# Patient Record
Sex: FEMALE | Race: WHITE | NOT HISPANIC OR LATINO | URBAN - METROPOLITAN AREA
[De-identification: names, ages, dates, MRNs, and addresses within clinical notes are randomized per-mention and may not be internally consistent; named-entity substitution may affect disease eponyms.]

---

## 2017-04-18 ENCOUNTER — IMPORTED ENCOUNTER (OUTPATIENT)
Dept: URBAN - METROPOLITAN AREA CLINIC 50 | Facility: CLINIC | Age: 77
End: 2017-04-18

## 2017-04-26 ENCOUNTER — IMPORTED ENCOUNTER (OUTPATIENT)
Dept: URBAN - METROPOLITAN AREA CLINIC 50 | Facility: CLINIC | Age: 77
End: 2017-04-26

## 2017-04-26 NOTE — PATIENT DISCUSSION
"""REC LIPIFLOW"" ""A. T.(Artificial Tears) BID - QID(2-4 times a day)Gel QHS(at bedtime)W. C.(warm compresses) FSO po BID (Flaxseed Oil

## 2018-05-11 ENCOUNTER — IMPORTED ENCOUNTER (OUTPATIENT)
Dept: URBAN - METROPOLITAN AREA CLINIC 50 | Facility: CLINIC | Age: 78
End: 2018-05-11

## 2018-11-09 ENCOUNTER — IMPORTED ENCOUNTER (OUTPATIENT)
Dept: URBAN - METROPOLITAN AREA CLINIC 50 | Facility: CLINIC | Age: 78
End: 2018-11-09

## 2019-05-03 ENCOUNTER — IMPORTED ENCOUNTER (OUTPATIENT)
Dept: URBAN - METROPOLITAN AREA CLINIC 50 | Facility: CLINIC | Age: 79
End: 2019-05-03

## 2019-05-03 NOTE — PATIENT DISCUSSION
"""Continue Artificial tears both eyes two - four times a day ."" ""Continue Gel drops both eyes at bedtime . "" ""Continue Warm compresses both eyes twice a day

## 2019-05-24 ENCOUNTER — IMPORTED ENCOUNTER (OUTPATIENT)
Dept: URBAN - METROPOLITAN AREA CLINIC 50 | Facility: CLINIC | Age: 79
End: 2019-05-24

## 2021-04-18 ASSESSMENT — VISUAL ACUITY
OD_CC: J1+
OD_SC: 20/25
OD_BAT: 20/40
OS_OTHER: 20/30. 20/60.
OS_SC: 20/25-2
OS_SC: 20/40-2
OS_SC: 20/20
OD_SC: 20/20
OS_PH: 20/20-1
OS_CC: J1+
OS_PH: 20/30-1
OS_SC: 20/30
OD_OTHER: 20/40. 20/60.
OD_SC: 20/20
OS_BAT: 20/30

## 2021-04-18 ASSESSMENT — TONOMETRY
OS_IOP_MMHG: 14
OD_IOP_MMHG: 12
OS_IOP_MMHG: 12
OD_IOP_MMHG: 14
OD_IOP_MMHG: 13
OD_IOP_MMHG: 12
OD_IOP_MMHG: 16
OS_IOP_MMHG: 12
OS_IOP_MMHG: 14
OS_IOP_MMHG: 14

## 2022-01-13 ENCOUNTER — ESTABLISHED PATIENT (OUTPATIENT)
Dept: URBAN - METROPOLITAN AREA CLINIC 50 | Facility: CLINIC | Age: 82
End: 2022-01-13

## 2022-01-13 DIAGNOSIS — H02.831: ICD-10-CM

## 2022-01-13 DIAGNOSIS — H52.4: ICD-10-CM

## 2022-01-13 DIAGNOSIS — H02.834: ICD-10-CM

## 2022-01-13 DIAGNOSIS — H26.491: ICD-10-CM

## 2022-01-13 DIAGNOSIS — H43.813: ICD-10-CM

## 2022-01-13 PROCEDURE — 92014 COMPRE OPH EXAM EST PT 1/>: CPT

## 2022-01-13 PROCEDURE — 92015 DETERMINE REFRACTIVE STATE: CPT

## 2022-01-13 ASSESSMENT — VISUAL ACUITY
OD_CC: 20/20-1
OU_CC: 20/20
OD_GLARE: 20/30
OS_CC: 20/30-1
OS_PH: 20/25-2
OD_GLARE: 20/20
OU_CC: J1+@16IN

## 2022-01-13 ASSESSMENT — TONOMETRY
OD_IOP_MMHG: 14
OS_IOP_MMHG: 16

## 2022-10-06 ENCOUNTER — ESTABLISHED PATIENT (OUTPATIENT)
Dept: URBAN - METROPOLITAN AREA CLINIC 50 | Facility: CLINIC | Age: 82
End: 2022-10-06

## 2022-10-06 DIAGNOSIS — H26.491: ICD-10-CM

## 2022-10-06 DIAGNOSIS — H16.223: ICD-10-CM

## 2022-10-06 PROCEDURE — 92012 INTRM OPH EXAM EST PATIENT: CPT

## 2022-10-06 RX ORDER — BRIMONIDINE TARTRATE 0.25 MG/ML: 1 SOLUTION/ DROPS OPHTHALMIC

## 2022-10-06 ASSESSMENT — TONOMETRY
OD_IOP_MMHG: 14
OS_IOP_MMHG: 15

## 2022-10-06 ASSESSMENT — VISUAL ACUITY
OU_SC: 20/20
OS_SC: 20/20-1
OD_SC: 20/20

## 2023-04-28 ENCOUNTER — COMPREHENSIVE EXAM (OUTPATIENT)
Dept: URBAN - METROPOLITAN AREA CLINIC 24 | Facility: CLINIC | Age: 83
End: 2023-04-28

## 2023-04-28 DIAGNOSIS — H26.491: ICD-10-CM

## 2023-04-28 PROCEDURE — 92014 COMPRE OPH EXAM EST PT 1/>: CPT

## 2023-04-28 ASSESSMENT — KERATOMETRY
OD_AXISANGLE2_DEGREES: 90
OS_K2POWER_DIOPTERS: 46.00
OS_AXISANGLE2_DEGREES: 90
OS_K1POWER_DIOPTERS: 46.00
OD_K1POWER_DIOPTERS: 45.00
OD_K2POWER_DIOPTERS: 45.00
OD_AXISANGLE_DEGREES: 0
OS_AXISANGLE_DEGREES: 0

## 2023-04-28 ASSESSMENT — VISUAL ACUITY
OU_CC: J1+@16"
OU_SC: 20/20
OS_SC: 20/25-1
OD_SC: 20/20
OD_GLARE: 20/20
OD_GLARE: 20/20

## 2023-04-28 ASSESSMENT — TONOMETRY
OD_IOP_MMHG: 14
OS_IOP_MMHG: 14

## 2023-06-23 ENCOUNTER — DIAGNOSTICS ONLY (OUTPATIENT)
Dept: URBAN - METROPOLITAN AREA CLINIC 52 | Facility: CLINIC | Age: 83
End: 2023-06-23

## 2023-06-23 DIAGNOSIS — H02.831: ICD-10-CM

## 2023-06-23 DIAGNOSIS — H02.834: ICD-10-CM

## 2023-06-23 PROCEDURE — 92285 EXTERNAL OCULAR PHOTOGRAPHY: CPT

## 2023-06-23 PROCEDURE — 92082 INTERMEDIATE VISUAL FIELD XM: CPT

## 2023-06-23 ASSESSMENT — KERATOMETRY
OS_AXISANGLE2_DEGREES: 90
OS_AXISANGLE_DEGREES: 0
OD_K2POWER_DIOPTERS: 45.00
OD_AXISANGLE2_DEGREES: 90
OD_K1POWER_DIOPTERS: 45.00
OS_K2POWER_DIOPTERS: 46.00
OS_K1POWER_DIOPTERS: 46.00
OD_AXISANGLE_DEGREES: 0

## 2023-07-28 ENCOUNTER — CONSULTATION/EVALUATION (OUTPATIENT)
Dept: URBAN - METROPOLITAN AREA CLINIC 52 | Facility: CLINIC | Age: 83
End: 2023-07-28

## 2023-07-28 DIAGNOSIS — H02.831: ICD-10-CM

## 2023-07-28 DIAGNOSIS — H02.834: ICD-10-CM

## 2023-07-28 PROCEDURE — 99213 OFFICE O/P EST LOW 20 MIN: CPT

## 2023-07-28 ASSESSMENT — VISUAL ACUITY
OS_SC: 20/30-2
OD_SC: 20/20
OS_PH: 20/20-1

## 2023-07-28 ASSESSMENT — TONOMETRY
OS_IOP_MMHG: 14
OD_IOP_MMHG: 14

## 2023-10-20 ENCOUNTER — PRE-OP/H&P (OUTPATIENT)
Dept: URBAN - METROPOLITAN AREA CLINIC 52 | Facility: CLINIC | Age: 83
End: 2023-10-20

## 2023-10-20 DIAGNOSIS — H02.831: ICD-10-CM

## 2023-10-20 DIAGNOSIS — H02.834: ICD-10-CM

## 2023-10-20 PROCEDURE — PREOP PRE OP VISIT

## 2023-10-20 ASSESSMENT — VISUAL ACUITY
OS_SC: 20/40-1
OS_PH: 20/25
OD_SC: 20/20

## 2023-10-31 ENCOUNTER — SURGERY/PROCEDURE (OUTPATIENT)
Dept: URBAN - METROPOLITAN AREA SURGERY 16 | Facility: SURGERY | Age: 83
End: 2023-10-31

## 2023-10-31 DIAGNOSIS — H02.831: ICD-10-CM

## 2023-10-31 DIAGNOSIS — H02.834: ICD-10-CM

## 2023-10-31 PROCEDURE — 15823 50 BLEPHAROPLASTY, UPPER WITH EXTENSIVE SKIN WEIGHING DOWN LID (BILATERAL)

## 2023-11-10 ENCOUNTER — POST-OP (OUTPATIENT)
Dept: URBAN - METROPOLITAN AREA CLINIC 52 | Facility: CLINIC | Age: 83
End: 2023-11-10

## 2023-11-10 DIAGNOSIS — Z98.890: ICD-10-CM

## 2023-11-10 PROCEDURE — 99024 POSTOP FOLLOW-UP VISIT: CPT

## 2023-11-10 ASSESSMENT — VISUAL ACUITY
OS_PH: 20/20
OD_SC: 20/20
OS_SC: 20/30

## 2023-12-08 ENCOUNTER — POST-OP (OUTPATIENT)
Dept: URBAN - METROPOLITAN AREA CLINIC 52 | Facility: CLINIC | Age: 83
End: 2023-12-08

## 2023-12-08 DIAGNOSIS — Z98.890: ICD-10-CM

## 2023-12-08 PROCEDURE — 99024 POSTOP FOLLOW-UP VISIT: CPT

## 2023-12-08 PROCEDURE — 92285 EXTERNAL OCULAR PHOTOGRAPHY: CPT

## 2023-12-08 ASSESSMENT — VISUAL ACUITY
OS_SC: 20/30
OS_PH: 20/20
OD_SC: 20/20

## 2024-12-27 ENCOUNTER — COMPREHENSIVE EXAM (OUTPATIENT)
Age: 84
End: 2024-12-27

## 2024-12-27 DIAGNOSIS — H26.491: ICD-10-CM

## 2024-12-27 PROCEDURE — 99214 OFFICE O/P EST MOD 30 MIN: CPT
